# Patient Record
Sex: FEMALE | Race: WHITE
[De-identification: names, ages, dates, MRNs, and addresses within clinical notes are randomized per-mention and may not be internally consistent; named-entity substitution may affect disease eponyms.]

---

## 2018-05-18 ENCOUNTER — HOSPITAL ENCOUNTER (OUTPATIENT)
Dept: HOSPITAL 38 - CC.SDS | Age: 78
End: 2018-05-18
Attending: FAMILY MEDICINE
Payer: MEDICARE

## 2018-05-18 DIAGNOSIS — K92.1: Primary | ICD-10-CM

## 2018-05-18 DIAGNOSIS — R10.9: ICD-10-CM

## 2018-05-18 NOTE — OR
DATE OF OPERATION:  05/18/2018

 

PREOPERATIVE DIAGNOSIS:  MELENA, ABDOMINAL PAIN.

 

POSTOPERATIVE DIAGNOSIS:  MELENA, ABDOMINAL PAIN.

 

SURGEON:  Shaheed Dobbs MD

 

PROCEDURE:  FULL-LENGTH COLONOSCOPY.

 

ANESTHESIA:  CRNA due to severe anxiety and history of alcohol abuse.

 

COMPLICATIONS:  None.

 

SPECIMEN:  None.

 

FINDINGS:

1. Full-length colonoscopy without identifiable etiology of the patient's

    melena.

2. Extremely poor prep right colon.

 

RECOMMENDATIONS:  Given the patient's symptoms and history of alcohol use, I

would recommend EGD for further delineation of the patient's melena.

 

INDICATIONS:  The patient apparently has been having some issues with melanotic

stools and some vague abdominal pain with a question of whether she had some

bright red blood as well on her toilet paper.  Ultimately, she was sent for a

colonoscopy as she had been over 20 years since her last.

 

DESCRIPTION OF PROCEDURE:  The patient was prepped and draped, placed in the

left lateral decubitus position.  A lubricated Olympus colonoscope was inserted

and ultimately advanced to the cecum.  The patient was extremely redundant colon

and it was very poor tone, it was difficult to advance through her, but we were

able to get over to the right colon and visualized the ileocecal valve and

appendiceal orifice.  The bowel prep on the right colon was extremely poor, I

had irrigated multiple times just to make sure that we were in the cecum, I

could not get most of the stool from that area out as it was too thick and this

went up into the proximal to mid ascending colon as well, but no gross

abnormalities could be visualized, although it is not definitive in that area.

Once we got up near the hepatic flexure, bowel prep became much better.  There

was still presence of stool most of this could be irrigated proximally down the

colon, so where we could visualize well.  Throughout the entire length of the

colon, I could find no obvious polyps, masses, ulceration, or bleeding sites.

There were no vascular abnormalities or signs of colitis.  The left colon had no

signs of significant diverticula.  The rectal vault was benign.  Retroflexion of

the scope in the rectum did confirm a significant amount of perianal

hemorrhoidal disease without any active bleeding.  Air was suctioned.  Scope was

removed without complication.

 

TITO/MIKO

DD:  05/18/2018 13:47:46

DT:  05/18/2018 14:32:31

Job #:  [id number]

Cc: Elpidio Diaz, [place]